# Patient Record
(demographics unavailable — no encounter records)

---

## 2018-05-08 NOTE — NUR
[id number] FOR FACIAL [employer]BRASION. WAS FOUND ON GROUND. PD WAS CALLED IN 
THE SCENE. NAD VSS RR EVEN AND UNLABORED. PENDING ER MD LEMOS

## 2018-05-19 NOTE — NUR
ATIVAN 2 MG IM   RT DELTOID   IV STARTED 20G RT FA LABS DRAWN SENT TO LAB  VS 
AWAITING EVALUATION BY ER PROVIDER.

## 2018-05-19 NOTE — NUR
FAMILY AT BEDSIDE    PT. VERBALIZED UNDERSTANDING OF AFTERCARE INSTRUCTIONS.IV 
removed. Catheter intact and site benign. Pressure and 4x4 applied to site. No 
bleeding noted.Patient discharged to home in stable condition. Written and 
verbal after care instructions given. Patient verbalizes understanding of 
instruction.